# Patient Record
(demographics unavailable — no encounter records)

---

## 2024-11-14 NOTE — DATA REVIEWED
[FreeTextEntry1] : I personally reviewed chart documentation - images (pertinent findings included into my note), including: - Dated 8/25/2023 from Dr. Ruth Vences. - CXR ~Oct 2023 reported as unremarkable.

## 2024-11-14 NOTE — DATA REVIEWED
[FreeTextEntry1] : I personally reviewed chart documentation - images (pertinent findings included into my note), including: - Dated 8/25/2023 from Dr. Ruth Vences. - CXR ~Oct 2023 reported as unremarkable. no

## 2024-11-14 NOTE — PHYSICAL EXAM
[Well Nourished] : well nourished [Well Developed] : well developed [Alert] : ~L alert [Active] : active [Normal Breathing Pattern] : normal breathing pattern [No Respiratory Distress] : no respiratory distress [No Allergic Shiners] : no allergic shiners [No Drainage] : no drainage [No Conjunctivitis] : no conjunctivitis [No Nasal Drainage] : no nasal drainage [No Polyps] : no polyps [No Sinus Tenderness] : no sinus tenderness [No Oral Pallor] : no oral pallor [No Oral Cyanosis] : no oral cyanosis [Non-Erythematous] : non-erythematous [No Exudates] : no exudates [No Postnasal Drip] : no postnasal drip [No Tonsillar Enlargement] : no tonsillar enlargement [Absence Of Retractions] : absence of retractions [Symmetric] : symmetric [Good Expansion] : good expansion [No Acc Muscle Use] : no accessory muscle use [No Crackles] : no crackles [No Rhonchi] : no rhonchi [No Wheezing] : no wheezing [Normal Sinus Rhythm] : normal sinus rhythm [No Heart Murmur] : no heart murmur [Soft, Non-Tender] : soft, non-tender [No Hepatosplenomegaly] : no hepatosplenomegaly [Non Distended] : was not ~L distended [Abdomen Mass (___ Cm)] : no abdominal mass palpated [Full ROM] : full range of motion [No Clubbing] : no clubbing [Capillary Refill < 2 secs] : capillary refill less than two seconds [No Cyanosis] : no cyanosis [No Petechiae] : no petechiae [No Kyphoscoliosis] : no kyphoscoliosis [No Contractures] : no contractures [Alert and  Oriented] : alert and oriented [No Abnormal Focal Findings] : no abnormal focal findings [Normal Muscle Tone And Reflexes] : normal muscle tone and reflexes [No Birth Marks] : no birth marks [No Rashes] : no rashes [No Skin Lesions] : no skin lesions [Tympanic Membranes Clear] : tympanic membranes were clear [Equal Breath Sounds] : equal breath sounds bilaterally [FreeTextEntry4] : +moderate BL nasal mucosa edema/erythema [FreeTextEntry7] : +marginal hypo-aeration throughout.

## 2024-11-14 NOTE — HISTORY OF PRESENT ILLNESS
[FreeTextEntry1] : DEANA is a 13 year old with working diagnosis of exercise-induced bronchoconstriction (EIB) and possibly intermittent asthma. Symptoms: recurrent dyspnea at rest/sports, "sighing" and yawning. Mother with asthma on Asmanex. AI: eosinophil 120 and non-allergic rhinitis. (-) RAST. Normal IgE.  VISIT 2024 - No sports induced symptoms. Continues occasional "sighing". - Continues pre-medicating with Albuterol. Used Flovent 110 for couple months; stopped 3 weeks ago (ran out). No difference while on Flovent 110. - Reports feeling well now. - No frequent albuterol use, ER visits or steroid use since last visit.  VISIT 2024: never started Flovent 110, never received. One SOB episode "at rest" resolve don its own. Not using albuterol pre-sports as no perceived symptoms. VISIT 2024: used Flovent 110 for couple of weeks (stopped 2024), did not feel a difference. Occasional "difficulty inhaling"; often at rest. Used Albuterol once -may have helped. Hocky sports trigger difficulty breathing sometimes, does not limit activity. Currently congested, mother thinks due to URI. EIB on exercise PFT (+ FEV1 reduction by 12%).  INITIAL VISIT Reports recurrent difficulty breathing in ~summer 2023 but possibly for longer. Yawning and sighing often. Symptoms occur at rest often but also with physical activity.  Very athletic, symptoms persist post-sports. Denies stridor, throat fullness/lump, hoarseness, palpitations, vertigo, or lightheadedness. No allergy symptoms or snoring reported. Mother had similar symptoms at younger age, now diagnosed with asthma. Patient evaluated by A/I ([facility], Dr. Jim Joseph), no allergy testing. PFT per documentation review: FVC 86, FEV 83 and UOR01-39% of 94. No significant post-bronchodilator response. CXR reported as normal.   RESPIRATORY HISTORY - Symptoms with colds / exertion: dyspnea. Never wakes up. - ER visits: no - Hospitalizations: no - ENT-related issues (snoring / AOM): no - Allergies: not tested. - Oral steroids: no - ASTHMA risk factors: +Mother recently diagnosed with asthma (similar symptoms as Deana), no PMH of eczema. - Covid info: no prior infection. Vaccinated: yes.   - Exposures (smoke, pets): +dog (had it long time) - Delayed vaccinations: no - Birth info: normal  course.   ____________________________________________________________________________________ Asthma Control Test (ACT) >12 year olds. In the last 4 weeks: -Shortness of breath: 5. none, 4. once to twice/week, 3. three to six/week, 2. once/day, 1. >once/day. Score: 5 -Nighttime stx: 5. none, 4. once to twice/MONTH, 3. once/week, 2. two to three/week, 1. > 4x/week. Score: 5 -Rescue inhaler: 5. none, 4. once/week, 3. two to three/week, 2. once to twice/day, 1. > 3x/day. Score: 5 -Rate asthma control: 5. controlled, 4. well controlled, 3. somewhat, 2. poorly, 1. uncontrolled. Score: 5 -Activity limitations: 5. none, 4. A little, 3. Some, 2. Most, 1. All the time. Score: 5 Total score: 25  If </or 19, asthma may not be controlled as well as it could be.

## 2024-11-14 NOTE — PHYSICAL EXAM
[Well Nourished] : well nourished [Well Developed] : well developed [Alert] : ~L alert [Active] : active [Normal Breathing Pattern] : normal breathing pattern [No Respiratory Distress] : no respiratory distress [No Allergic Shiners] : no allergic shiners [No Drainage] : no drainage [No Conjunctivitis] : no conjunctivitis [No Nasal Drainage] : no nasal drainage [No Polyps] : no polyps [No Sinus Tenderness] : no sinus tenderness [No Oral Pallor] : no oral pallor [No Oral Cyanosis] : no oral cyanosis [Non-Erythematous] : non-erythematous [No Exudates] : no exudates [No Postnasal Drip] : no postnasal drip [No Tonsillar Enlargement] : no tonsillar enlargement [Absence Of Retractions] : absence of retractions [Symmetric] : symmetric [Good Expansion] : good expansion [No Acc Muscle Use] : no accessory muscle use [No Crackles] : no crackles [No Rhonchi] : no rhonchi [No Wheezing] : no wheezing [Normal Sinus Rhythm] : normal sinus rhythm [No Heart Murmur] : no heart murmur [Soft, Non-Tender] : soft, non-tender [No Hepatosplenomegaly] : no hepatosplenomegaly [Non Distended] : was not ~L distended [Abdomen Mass (___ Cm)] : no abdominal mass palpated [Full ROM] : full range of motion [No Clubbing] : no clubbing [Capillary Refill < 2 secs] : capillary refill less than two seconds [No Cyanosis] : no cyanosis [No Petechiae] : no petechiae [No Kyphoscoliosis] : no kyphoscoliosis [No Contractures] : no contractures [Alert and  Oriented] : alert and oriented [No Abnormal Focal Findings] : no abnormal focal findings [Normal Muscle Tone And Reflexes] : normal muscle tone and reflexes [No Birth Marks] : no birth marks none [No Rashes] : no rashes [No Skin Lesions] : no skin lesions [Tympanic Membranes Clear] : tympanic membranes were clear [Equal Breath Sounds] : equal breath sounds bilaterally [FreeTextEntry4] : +moderate BL nasal mucosa edema/erythema [FreeTextEntry7] : +marginal hypo-aeration throughout.

## 2024-11-14 NOTE — IMPRESSION
[FreeTextEntry1] : 12/6/2023 pre-post spirometry: normal flows with partial postbronchodilator response (HMG37-67% improved by 16%). FeNO: normal range at 9. 2/28/2024 complete PFT and exercise PFT: normal flows, TLC. Mild decreased DLCO. Positive exercise PFT, FEV1 decrease by 12%. 6/27/2024 simple PFT: normal spirometry. 11/13/2024 simple PFT: normal spirometry.

## 2024-11-14 NOTE — HISTORY OF PRESENT ILLNESS
[FreeTextEntry1] : DEANA is a 13 year old with working diagnosis of exercise-induced bronchoconstriction (EIB) and possibly intermittent asthma. Symptoms: recurrent dyspnea at rest/sports, "sighing" and yawning. Mother with asthma on Asmanex. AI: eosinophil 120 and non-allergic rhinitis. (-) RAST. Normal IgE.  VISIT 2024 - No sports induced symptoms. Continues occasional "sighing". - Continues pre-medicating with Albuterol. Used Flovent 110 for couple months; stopped 3 weeks ago (ran out). No difference while on Flovent 110. - Reports feeling well now. - No frequent albuterol use, ER visits or steroid use since last visit.  VISIT 2024: never started Flovent 110, never received. One SOB episode "at rest" resolve don its own. Not using albuterol pre-sports as no perceived symptoms. VISIT 2024: used Flovent 110 for couple of weeks (stopped 2024), did not feel a difference. Occasional "difficulty inhaling"; often at rest. Used Albuterol once -may have helped. Hocky sports trigger difficulty breathing sometimes, does not limit activity. Currently congested, mother thinks due to URI. EIB on exercise PFT (+ FEV1 reduction by 12%).  INITIAL VISIT Reports recurrent difficulty breathing in ~summer 2023 but possibly for longer. Yawning and sighing often. Symptoms occur at rest often but also with physical activity.  Very athletic, symptoms persist post-sports. Denies stridor, throat fullness/lump, hoarseness, palpitations, vertigo, or lightheadedness. No allergy symptoms or snoring reported. Mother had similar symptoms at younger age, now diagnosed with asthma. Patient evaluated by A/I ([facility], Dr. Jim Joseph), no allergy testing. PFT per documentation review: FVC 86, FEV 83 and YRR90-87% of 94. No significant post-bronchodilator response. CXR reported as normal.   RESPIRATORY HISTORY - Symptoms with colds / exertion: dyspnea. Never wakes up. - ER visits: no - Hospitalizations: no - ENT-related issues (snoring / AOM): no - Allergies: not tested. - Oral steroids: no - ASTHMA risk factors: +Mother recently diagnosed with asthma (similar symptoms as Deana), no PMH of eczema. - Covid info: no prior infection. Vaccinated: yes.   - Exposures (smoke, pets): +dog (had it long time) - Delayed vaccinations: no - Birth info: normal  course.   ____________________________________________________________________________________ Asthma Control Test (ACT) >12 year olds. In the last 4 weeks: -Shortness of breath: 5. none, 4. once to twice/week, 3. three to six/week, 2. once/day, 1. >once/day. Score: 5 -Nighttime stx: 5. none, 4. once to twice/MONTH, 3. once/week, 2. two to three/week, 1. > 4x/week. Score: 5 -Rescue inhaler: 5. none, 4. once/week, 3. two to three/week, 2. once to twice/day, 1. > 3x/day. Score: 5 -Rate asthma control: 5. controlled, 4. well controlled, 3. somewhat, 2. poorly, 1. uncontrolled. Score: 5 -Activity limitations: 5. none, 4. A little, 3. Some, 2. Most, 1. All the time. Score: 5 Total score: 25  If </or 19, asthma may not be controlled as well as it could be.

## 2024-11-14 NOTE — IMPRESSION
[FreeTextEntry1] : 12/6/2023 pre-post spirometry: normal flows with partial postbronchodilator response (BGX75-26% improved by 16%). FeNO: normal range at 9. 2/28/2024 complete PFT and exercise PFT: normal flows, TLC. Mild decreased DLCO. Positive exercise PFT, FEV1 decrease by 12%. 6/27/2024 simple PFT: normal spirometry. 11/13/2024 simple PFT: normal spirometry.

## 2024-11-14 NOTE — REVIEW OF SYSTEMS
[NI] : Genitourinary  [Nl] : Endocrine [Shortness of Breath] : shortness of breath [Nasal Congestion] : nasal congestion

## 2024-11-14 NOTE — ASSESSMENT
[FreeTextEntry1] : DEANA, 13 year old girl with history of exercise-induced bronchoconstriction (EIB) and suspected persistent asthma based on reported symptoms (outside of sports).  Exertional and non-exertional dyspnea and other symptoms [sighing] are concerning for intermittent asthma. ICS failed to make changes to symptom frequency, no symptoms except for sighing recently. Environmental triggers may be present although negative SPT. As FMH of asthma with similar symptoms, I will continue to recommend ICS as needed (if symptoms frequency increase). Her spirometry today was normal, suggesting no permanent airway inflammation (uncontrolled asthma). Fow now, should continue pre-sports Albuterol. Prior FeNOs was normal. Eosinophils 120 may suggest underlying atopy although her IgE was normal. FMH of asthma.  EIB is present base on positive exercise PFT (FEV1% decline by 12%). Recommend pre-sport warmups and ongoing use of albuterol pre-sports. Cannot rule-out vocal cord dysfunction (VCD) given marginal inspiratory loop flattening on her flow-volume curve on PFTs. May evaluate need for SLP evaluation as vocal exercise may be helpful. Regardless, would monitor for now as albuterol use seems to be preventing any symptoms now.  Non-allergic rhinitis (DAVID) may be present given recurrent nasal congestion and negative RAST. DAVID may be a contributor of asthma symptoms. Seen by outside Allergist. SPT testing not done but can be considered if symptoms worsen. Antihistamines use as needed.  A Pulmonary Function Testing (PFT) was used to obtain lung function data. The results today showed normal exhaled flows. Previous PFT: normal volumes. sRaw was previously increased. Mildly decreased DLCO likely due to technique, regardless, plan to repeat DLCO in the future.   Discussed above assessment, management plan and potential medication side effects. Parent agreed with plan. All queries were answered. Evaluation includes normal saturation. Time excludes separately reported services.   Recommend: - Intermittent use (if symptoms worsen): Flovent 110 mcg, 2 puffs twice daily. - RESCUE INHALER: Albuterol as needed. May use 15 mins before sports. - Daily antihistamine if allergy symptoms persist (congestion). - Discuss speech referral. - "Complete PFT at next visit". - Follow-up in 5-6 months.

## 2024-11-14 NOTE — CONSULT LETTER
[Dear  ___] : Dear  [unfilled], [Consult Letter:] : I had the pleasure of evaluating your patient, [unfilled]. [Please see my note below.] : Please see my note below. [Consult Closing:] : Thank you very much for allowing me to participate in the care of this patient.  If you have any questions, please do not hesitate to contact me. [Sincerely,] : Sincerely, [FreeTextEntry3] : Karri Couch MD Attending Physician, Division of Pediatric Pulmonology.

## 2025-04-23 NOTE — REVIEW OF SYSTEMS
[NI] : Genitourinary  [Nl] : Endocrine [Nasal Congestion] : nasal congestion [Shortness of Breath] : shortness of breath [FreeTextEntry6] : +chest tightness

## 2025-04-23 NOTE — HISTORY OF PRESENT ILLNESS
[FreeTextEntry1] : DEANA is a 13 year old with mild persistent asthma / bronchoconstriction (EIB) based on FEV1 reduction of 12% on exercise PFT. Symptoms: recurrent dyspnea at rest/sports, "sighing" and yawning. Mother with similar symptoms/asthma. FMH: Mother with asthma on Asmanex. AI: eosinophil 120. Non-allergic rhinitis (DAVID). Normal IgE. ENT: mild tonsillar hypertrophy. Teeth grinding. No snoring.  CLINIC VISIT 2025 - Medications: Fluticasone Propionate 110 2 puffs BID, Albuterol PRN and before sports - Recent symptoms: recurring shortness of breath/chest tightness/ sighing episodes at rest. No triggers identified, including no recent URIs. May have noted more frequent episodes in winter/colder weather. - Recent Albuterol: used occasionally with "above episodes of SOB" - partial improvement. - No sports-induced shortness of breath. - Recent Oral steroids or ER visits: no. - ENT: teeth grinding. No snoring/mouth breathing.  VISIT 2024 - No sports induced symptoms. Continues occasional "sighing". - Continues pre-medicating with Albuterol. Used Flovent 110 for couple months; stopped 3 weeks ago (ran out). No difference while on Flovent 110. - Reports feeling well now. - No frequent albuterol use, ER visits or steroid use since last visit.  VISIT 2024: never started Flovent 110, never received. One SOB episode "at rest" resolve don its own. Not using albuterol pre-sports as no perceived symptoms. VISIT 2024: used Flovent 110 for couple of weeks (stopped 2024), did not feel a difference. Occasional "difficulty inhaling"; often at rest. Used Albuterol once -may have helped. Hocky sports trigger difficulty breathing sometimes, does not limit activity. Currently congested, mother thinks due to URI. EIB on exercise PFT (+ FEV1 reduction by 12%).  INITIAL VISIT Reports recurrent difficulty breathing in ~summer 2023 but possibly for longer. Yawning and sighing often. Symptoms occur at rest often but also with physical activity.  Very athletic, symptoms persist post-sports. Denies stridor, throat fullness/lump, hoarseness, palpitations, vertigo, or lightheadedness. No allergy symptoms or snoring reported. Mother had similar symptoms at younger age, now diagnosed with asthma. Patient evaluated by A/I ([facility], Dr. Jim Joseph), no allergy testing. PFT per documentation review: FVC 86, FEV 83 and EAY33-90% of 94. No significant post-bronchodilator response. CXR reported as normal.   RESPIRATORY HISTORY - Symptoms with colds / exertion: dyspnea. Never wakes up. - ER visits: no - Hospitalizations: no - ENT-related issues (snoring / AOM): no - Allergies: not tested. - Oral steroids: no - ASTHMA risk factors: +Mother recently diagnosed with asthma (similar symptoms as Deana), no PMH of eczema. - Covid info: no prior infection. Vaccinated: yes.   - Exposures (smoke, pets): +dog (had it long time) - Delayed vaccinations: no - Birth info: normal  course.   ____________________________________________________________________________________ Asthma Control Test (ACT) >12 year olds. In the last 4 weeks: -Shortness of breath: 5. none, 4. once to twice/week, 3. three to six/week, 2. once/day, 1. >once/day. Score: 4 -Nighttime stx: 5. none, 4. once to twice/MONTH, 3. once/week, 2. two to three/week, 1. > 4x/week. Score: 5 -Rescue inhaler: 5. none, 4. once/week, 3. two to three/week, 2. once to twice/day, 1. > 3x/day. Score: 4 -Rate asthma control: 5. controlled, 4. well controlled, 3. somewhat, 2. poorly, 1. uncontrolled. Score: 3 -Activity limitations: 5. none, 4. A little, 3. Some, 2. Most, 1. All the time. Score: 5 Total score: 21  If </or 19, asthma may not be controlled as well as it could be.

## 2025-04-23 NOTE — IMPRESSION
[FreeTextEntry1] : 12/6/2023 pre-post spirometry: normal flows with partial postbronchodilator response (PEH31-83% improved by 16%). FeNO: normal range at 9. 2/28/2024 complete PFT and exercise PFT: normal flows, TLC. Mild decreased DLCO. Positive exercise PFT, FEV1 decrease by 12%. 6/27/2024 simple PFT: normal spirometry. 11/13/2024 simple PFT: normal spirometry. 4/23/2025 complete PFT: normal sprometry w/o bronchodilator response. Normal TLC/DLCO.

## 2025-04-23 NOTE — ASSESSMENT
[FreeTextEntry1] : DEANA, 13 year old girl with mild persistent asthma with exercise-induced bronchoconstriction (EIB).  Patient's interval history (resolution of dyspnea at rest following ICS use), ACT score (>19) and physical exam (overall unremarkable lung exam) support a now well controlled asthma. Patient's symptoms (sighing, SOB, chest tightens) are consistent with asthma. Controlled asthma will reduce risk of significant respiratory symptoms and/or life-threatening exacerbations. Recommend continuing Fluticasone Propionate 110 as this has helped controls symptoms. Discussed different environmental triggers (including dry air, dust in home, etc) that may lead to episodes of bronchoconstriction. Prior FeNOs was normal. Eosinophils 120 may suggest underlying atopy although her IgE was normal. FMH of asthma (Asthma has similar symptoms as Deana, now on Asmanex). Data reviewed and interpreted by me, including pulmonary function test (PFT)/spirometry, revealed normal results supporting controlled asthma. Full PFT 4/23/2025 was normal. Previous PFT: normal volumes. sRaw was previously increased. Mildly decreased DLCO likely due to technique, regardless, plan to repeat DLCO in the future.  EIB. Should continue pre-sports Albuterol. Reassuring that improvement has been noted recently. EIB diagnosed based on positive exercise PFT (FEV1% decline by 12%).  Vocal cord dysfunction (VCD) was previously considered given marginal inspiratory loop flattening on her flow-volume loop on PFTs. At this point it is lower in my differential as does not present classic symptoms.  Non-allergic rhinitis (DAVID) may be present. Seen by outside Allergist. SPT testing not done. RAST was negative. Also, mild tonsillar hypertrophy (TH) which in conjunction to "teeth grinding" may raise concern for ANGELICA. Offered family possibility of arranging for a sleep study; deferred for now as doing overall well.   Discussed above assessment, management plan and potential medication side effects. Parent agreed with plan. All queries were answered. Evaluation includes normal saturation. Time excludes separately reported services.   Recommend: - Continue "daily" use of Fluticasone Propionate 110 mcg, 2 puffs twice daily. ---- Started early April 2025. Will continue as improvement noted--- ---- Monitor episodes of SOB --- - RESCUE INHALER: Albuterol as needed. May use 15 mins before sports. - ALLERGY: monitor symptoms. Antihistamine as needed. - Teeth grinding. Will monitor as sleeps well. - Follow-up in 3-4 months. - Simple PFT at next visit.

## 2025-04-23 NOTE — IMPRESSION
[FreeTextEntry1] : 12/6/2023 pre-post spirometry: normal flows with partial postbronchodilator response (LBO66-38% improved by 16%). FeNO: normal range at 9. 2/28/2024 complete PFT and exercise PFT: normal flows, TLC. Mild decreased DLCO. Positive exercise PFT, FEV1 decrease by 12%. 6/27/2024 simple PFT: normal spirometry. 11/13/2024 simple PFT: normal spirometry. 4/23/2025 complete PFT: normal sprometry w/o bronchodilator response. Normal TLC/DLCO.

## 2025-04-23 NOTE — PHYSICAL EXAM
[Well Nourished] : well nourished [Well Developed] : well developed [Alert] : ~L alert [Active] : active [Normal Breathing Pattern] : normal breathing pattern [No Respiratory Distress] : no respiratory distress [No Allergic Shiners] : no allergic shiners [No Drainage] : no drainage [No Conjunctivitis] : no conjunctivitis [Tympanic Membranes Clear] : tympanic membranes were clear [No Nasal Drainage] : no nasal drainage [No Polyps] : no polyps [No Sinus Tenderness] : no sinus tenderness [No Oral Pallor] : no oral pallor [No Oral Cyanosis] : no oral cyanosis [Non-Erythematous] : non-erythematous [No Exudates] : no exudates [No Postnasal Drip] : no postnasal drip [No Tonsillar Enlargement] : no tonsillar enlargement [Absence Of Retractions] : absence of retractions [Symmetric] : symmetric [Good Expansion] : good expansion [No Acc Muscle Use] : no accessory muscle use [Equal Breath Sounds] : equal breath sounds bilaterally [No Crackles] : no crackles [No Rhonchi] : no rhonchi [No Wheezing] : no wheezing [Normal Sinus Rhythm] : normal sinus rhythm [No Heart Murmur] : no heart murmur [Soft, Non-Tender] : soft, non-tender [No Hepatosplenomegaly] : no hepatosplenomegaly [Non Distended] : was not ~L distended [Abdomen Mass (___ Cm)] : no abdominal mass palpated [Full ROM] : full range of motion [No Clubbing] : no clubbing [Capillary Refill < 2 secs] : capillary refill less than two seconds [No Cyanosis] : no cyanosis [No Petechiae] : no petechiae [No Kyphoscoliosis] : no kyphoscoliosis [No Contractures] : no contractures [Alert and  Oriented] : alert and oriented [No Abnormal Focal Findings] : no abnormal focal findings [Normal Muscle Tone And Reflexes] : normal muscle tone and reflexes [No Birth Marks] : no birth marks [No Rashes] : no rashes [No Skin Lesions] : no skin lesions [FreeTextEntry4] : +moderate BL nasal mucosa edema/erythema [FreeTextEntry7] : +marginal hypo-aeration throughout, greater to right lung field.

## 2025-04-23 NOTE — HISTORY OF PRESENT ILLNESS
[FreeTextEntry1] : DEANA is a 13 year old with mild persistent asthma / bronchoconstriction (EIB) based on FEV1 reduction of 12% on exercise PFT. Symptoms: recurrent dyspnea at rest/sports, "sighing" and yawning. Mother with similar symptoms/asthma. FMH: Mother with asthma on Asmanex. AI: eosinophil 120. Non-allergic rhinitis (DAVID). Normal IgE. ENT: mild tonsillar hypertrophy. Teeth grinding. No snoring.  CLINIC VISIT 2025 - Medications: Fluticasone Propionate 110 2 puffs BID, Albuterol PRN and before sports - Recent symptoms: recurring shortness of breath/chest tightness/ sighing episodes at rest. No triggers identified, including no recent URIs. May have noted more frequent episodes in winter/colder weather. - Recent Albuterol: used occasionally with "above episodes of SOB" - partial improvement. - No sports-induced shortness of breath. - Recent Oral steroids or ER visits: no. - ENT: teeth grinding. No snoring/mouth breathing.  VISIT 2024 - No sports induced symptoms. Continues occasional "sighing". - Continues pre-medicating with Albuterol. Used Flovent 110 for couple months; stopped 3 weeks ago (ran out). No difference while on Flovent 110. - Reports feeling well now. - No frequent albuterol use, ER visits or steroid use since last visit.  VISIT 2024: never started Flovent 110, never received. One SOB episode "at rest" resolve don its own. Not using albuterol pre-sports as no perceived symptoms. VISIT 2024: used Flovent 110 for couple of weeks (stopped 2024), did not feel a difference. Occasional "difficulty inhaling"; often at rest. Used Albuterol once -may have helped. [place] sports trigger difficulty breathing sometimes, does not limit activity. Currently congested, mother thinks due to URI. EIB on exercise PFT (+ FEV1 reduction by 12%).  INITIAL VISIT Reports recurrent difficulty breathing in ~summer 2023 but possibly for longer. Yawning and sighing often. Symptoms occur at rest often but also with physical activity.  Very athletic, symptoms persist post-sports. Denies stridor, throat fullness/lump, hoarseness, palpitations, vertigo, or lightheadedness. No allergy symptoms or snoring reported. Mother had similar symptoms at younger age, now diagnosed with asthma. Patient evaluated by A/I ([facility], Dr. Jim Joseph), no allergy testing. PFT per documentation review: FVC 86, FEV 83 and [id number]% of 94. No significant post-bronchodilator response. CXR reported as normal.   RESPIRATORY HISTORY - Symptoms with colds / exertion: dyspnea. Never wakes up. - ER visits: no - Hospitalizations: no - ENT-related issues (snoring / AOM): no - Allergies: not tested. - Oral steroids: no - ASTHMA risk factors: +Mother recently diagnosed with asthma (similar symptoms as Deana), no PMH of eczema. - Covid info: no prior infection. Vaccinated: yes.   - Exposures (smoke, pets): +dog (had it long time) - Delayed vaccinations: no - Birth info: normal  course.   ____________________________________________________________________________________ Asthma Control Test (ACT) >12 year olds. In the last 4 weeks: -Shortness of breath: 5. none, 4. once to twice/week, 3. three to six/week, 2. once/day, 1. >once/day. Score: 4 -Nighttime stx: 5. none, 4. once to twice/MONTH, 3. once/week, 2. two to three/week, 1. > 4x/week. Score: 5 -Rescue inhaler: 5. none, 4. once/week, 3. two to three/week, 2. once to twice/day, 1. > 3x/day. Score: 4 -Rate asthma control: 5. controlled, 4. well controlled, 3. somewhat, 2. poorly, 1. uncontrolled. Score: 3 -Activity limitations: 5. none, 4. A little, 3. Some, 2. Most, 1. All the time. Score: 5 Total score: 21  If </or 19, asthma may not be controlled as well as it could be.